# Patient Record
Sex: MALE | Race: WHITE | Employment: FULL TIME | URBAN - METROPOLITAN AREA
[De-identification: names, ages, dates, MRNs, and addresses within clinical notes are randomized per-mention and may not be internally consistent; named-entity substitution may affect disease eponyms.]

---

## 2022-06-29 ENCOUNTER — HOSPITAL ENCOUNTER (EMERGENCY)
Age: 37
Discharge: HOME | End: 2022-06-29
Attending: EMERGENCY MEDICINE
Payer: COMMERCIAL

## 2022-06-29 VITALS
RESPIRATION RATE: 20 BRPM | SYSTOLIC BLOOD PRESSURE: 142 MMHG | OXYGEN SATURATION: 97 % | DIASTOLIC BLOOD PRESSURE: 93 MMHG | HEART RATE: 69 BPM | TEMPERATURE: 97 F

## 2022-06-29 DIAGNOSIS — M54.42 ACUTE LEFT-SIDED LOW BACK PAIN WITH LEFT-SIDED SCIATICA: Primary | ICD-10-CM

## 2022-06-29 PROCEDURE — 74011000250 HC RX REV CODE- 250: Performed by: PHYSICIAN ASSISTANT

## 2022-06-29 PROCEDURE — 74011250637 HC RX REV CODE- 250/637: Performed by: PHYSICIAN ASSISTANT

## 2022-06-29 PROCEDURE — 74011250636 HC RX REV CODE- 250/636: Performed by: PHYSICIAN ASSISTANT

## 2022-06-29 PROCEDURE — 96372 THER/PROPH/DIAG INJ SC/IM: CPT

## 2022-06-29 PROCEDURE — 99284 EMERGENCY DEPT VISIT MOD MDM: CPT

## 2022-06-29 RX ORDER — MORPHINE SULFATE 4 MG/ML
4 INJECTION INTRAVENOUS
Status: COMPLETED | OUTPATIENT
Start: 2022-06-29 | End: 2022-06-29

## 2022-06-29 RX ORDER — OXYCODONE AND ACETAMINOPHEN 5; 325 MG/1; MG/1
1 TABLET ORAL
Qty: 12 TABLET | Refills: 0 | Status: SHIPPED | OUTPATIENT
Start: 2022-06-29 | End: 2022-07-02

## 2022-06-29 RX ORDER — LIDOCAINE 50 MG/G
PATCH TOPICAL
Qty: 15 EACH | Refills: 0 | Status: SHIPPED | OUTPATIENT
Start: 2022-06-29

## 2022-06-29 RX ORDER — LIDOCAINE 4 G/100G
1 PATCH TOPICAL
Status: DISCONTINUED | OUTPATIENT
Start: 2022-06-29 | End: 2022-06-29 | Stop reason: HOSPADM

## 2022-06-29 RX ORDER — ACETAMINOPHEN 500 MG
1000 TABLET ORAL
Status: COMPLETED | OUTPATIENT
Start: 2022-06-29 | End: 2022-06-29

## 2022-06-29 RX ORDER — DICLOFENAC SODIUM 75 MG/1
75 TABLET, DELAYED RELEASE ORAL
Qty: 20 TABLET | Refills: 0 | Status: SHIPPED | OUTPATIENT
Start: 2022-06-29

## 2022-06-29 RX ORDER — METHOCARBAMOL 500 MG/1
500 TABLET, FILM COATED ORAL
Status: COMPLETED | OUTPATIENT
Start: 2022-06-29 | End: 2022-06-29

## 2022-06-29 RX ORDER — KETOROLAC TROMETHAMINE 30 MG/ML
30 INJECTION, SOLUTION INTRAMUSCULAR; INTRAVENOUS
Status: COMPLETED | OUTPATIENT
Start: 2022-06-29 | End: 2022-06-29

## 2022-06-29 RX ORDER — METHOCARBAMOL 500 MG/1
500 TABLET, FILM COATED ORAL
Qty: 20 TABLET | Refills: 0 | Status: SHIPPED | OUTPATIENT
Start: 2022-06-29

## 2022-06-29 RX ADMIN — KETOROLAC TROMETHAMINE 30 MG: 30 INJECTION, SOLUTION INTRAMUSCULAR; INTRAVENOUS at 16:10

## 2022-06-29 RX ADMIN — METHOCARBAMOL TABLETS 500 MG: 500 TABLET, COATED ORAL at 16:10

## 2022-06-29 RX ADMIN — MORPHINE SULFATE 4 MG: 4 INJECTION, SOLUTION INTRAMUSCULAR; INTRAVENOUS at 16:10

## 2022-06-29 RX ADMIN — ACETAMINOPHEN 1000 MG: 500 TABLET ORAL at 16:10

## 2022-06-29 NOTE — ED PROVIDER NOTES
42 y/o male presenting with complaint of back pain. The patient states that he has had intermittent back pain in the past, went to Ortho on-call last week and was prescribed a Medrol Dosepak which he finished today. He had been feeling better over the past day or two and was more active than he had been since the onset of his current episode of back pain, however he noticed worsening back pain last night and woke up with severe pain radiating to his left leg. The pain is rated 10/10, radiating from his left low back to his left calf, described as aching. He took ibuprofen about 30 minutes prior to arrival with some relief. He denies fevers, weakness, numbness, saddle anesthesia, bladder/bowel dysfunction, dysuria or urgency/frequency. The history is provided by the patient and the spouse. No past medical history on file. No past surgical history on file. No family history on file. Social History     Socioeconomic History    Marital status:      Spouse name: Not on file    Number of children: Not on file    Years of education: Not on file    Highest education level: Not on file   Occupational History    Not on file   Tobacco Use    Smoking status: Light Tobacco Smoker     Types: Cigars    Smokeless tobacco: Not on file    Tobacco comment: 2 cigars per year   Substance and Sexual Activity    Alcohol use: Yes     Alcohol/week: 14.0 standard drinks     Types: 14 Cans of beer per week    Drug use: No    Sexual activity: Not on file   Other Topics Concern    Not on file   Social History Narrative    Not on file     Social Determinants of Health     Financial Resource Strain:     Difficulty of Paying Living Expenses: Not on file   Food Insecurity:     Worried About Running Out of Food in the Last Year: Not on file    Gila of Food in the Last Year: Not on file   Transportation Needs:     Lack of Transportation (Medical):  Not on file    Lack of Transportation Daily Note     Today's date: 3/6/2018  Patient name: Deette Boast  : 1969  MRN: [id number]  Referring provider: Karl Garza MD  Dx:   Encounter Diagnosis     ICD-10-CM    1  Right hip pain M25 [id number]                   Subjective: Pt reports continued soreness in R hip  Feels good after PT but notes that it tightens up in the hours following  Called MD w/ questions but has not heard back as of yet  Objective: Pt requires cueing for upright postural control during standing drills at bar  Corrects well  R sided trunk lean during normal ambulation, mod correction w/ cueing       Precautions: Born w/ hip dysplasia     Daily Treatment Diary     Manual   3/6        LAD 2x2 min hold onR 2x2 min holds 2x2 mins holds x2 mins on R  x2 mins        "the stick" to quad, ITB, HS on R x8 mins total x9 mins  x7 mins   x6 mins                                                    Exercise Diary   3/6        SLR w/ QS             bridging 2/ add squeeze 2x10 3x30 w/ add squeeze 3x30 sec w/ add squeeze 3x10 w/ add squeeze 3x10 w/ add squeeze        Hip add squeeze 3 sec hold x 20  5 sec hold 2x10           Supine single leg fall outs (gentle) YTB 2x10 B   clam shells x 30 s/l clam shells x 30        Standing mini squats  x25 total x30 total x20 total x30 at bar        pball iso hip ext 2x10 B, marching 2x10 B  marching and hip abd 2x10  marching w/ 3 sec ecc fall 3x10 on R only        pball isos 3 sec x 10  3 sec x 10  standing hip abd and ext kicks 3x10        pball squats  x30 total through 33%   2x10, focus on glute drive         Stair training     4" step ups 2x10 on R, min UE support         Marching              Self HF stretch in standing x30 sec   reviewed  reviewed        Self HS stretching sitting x30 sec   reviewed  reviewed        Stationary bike pre  x7 min  X7 5 min pre x8 min pre x8 min pre, lvl 3 x8 min pre, lvl 3          Manual HS , cross body glute stretch (Non-Medical): Not on file   Physical Activity:     Days of Exercise per Week: Not on file    Minutes of Exercise per Session: Not on file   Stress:     Feeling of Stress : Not on file   Social Connections:     Frequency of Communication with Friends and Family: Not on file    Frequency of Social Gatherings with Friends and Family: Not on file    Attends Zoroastrian Services: Not on file    Active Member of [address] [place] [address] or Organizations: Not on file    Attends Club or Organization Meetings: Not on file    Marital Status: Not on file   Intimate Partner Violence:     Fear of Current or Ex-Partner: Not on file    Emotionally Abused: Not on file    Physically Abused: Not on file    Sexually Abused: Not on file   Housing Stability:     Unable to Pay for Housing in the Last Year: Not on file    Number of [place] in the Last Year: Not on file    Unstable Housing in the Last Year: Not on file         ALLERGIES: Other food and Benadryl [diphenhydramine hcl]    Review of Systems   Constitutional: Negative for chills and fever. HENT: Negative for congestion. Respiratory: Negative for cough. Gastrointestinal: Negative for diarrhea and vomiting. Genitourinary: Negative for difficulty urinating, dysuria, frequency, hematuria and urgency. Musculoskeletal: Positive for back pain. Skin: Negative for wound. Neurological: Negative for weakness and numbness. Vitals:    06/29/22 1433   BP: (!) 142/93   Pulse: 69   Resp: 20   Temp: 97 °F (36.1 °C)   SpO2: 97%            Physical Exam  Vitals and nursing note reviewed. Constitutional:       General: He is not in acute distress. Appearance: He is well-developed. He is not diaphoretic. HENT:      Head: Normocephalic and atraumatic. Eyes:      Conjunctiva/sclera: Conjunctivae normal.   Cardiovascular:      Rate and Rhythm: Normal rate. Pulmonary:      Effort: Pulmonary effort is normal. No respiratory distress.    Musculoskeletal:      Comments: 3x30 sec holds each Manual HS and hip add stretch 3x30 sec holds on R Manual HS and HF stretch 3x30 sec holds on R Manual HS and HF stretch 3x30 sec holds on R          PROM for R hip flex/ext x5 mins PROM for R hip flex/ext x5 mins PROM for R hip flex/ext x5 mins PROM for R hip flex/ext x5 mins          Manual hip add stretch 3x30 sec holds Self gastroc stretch 3x30 sec holds B               YTB abd walk 4x50'              Heel raises x 30 total                                      Modalities                                                         Assessment: Tolerated treatment well  Patient would benefit from continued PT  Does well w/ today's program, notes discomfort during ambulation but is able to perform all exercises at bar w/o pain through R LE   Encouraged to not perform SLS drills on R as this time so as not to exacerbate sx, verbalizes understanding  MD called pt during visit and assured her that continued discomfort and tightness in hip is normal at this time  Pt expresses relief w/ this  Progress as able at next session  Plan: Continue per plan of care   step ups, gait training, sit<>stand elevated No midline or muscular lumbar tenderness to palpation. No tenderness of left lower extremity. Skin:     General: Skin is warm and dry. Neurological:      Mental Status: He is alert and oriented to person, place, and time. Comments: 5/5 strength of bilateral lower extremities with intact light touch sensation. MDM       Procedures        42 y/o male presenting with complaint of back pain. History and exam consistent with lumbar radiculopathy/disc hernation. No history of trauma or midline tenderness - doubt fractures or ligamentous injuries. No neuro deficits or history of saddle anesthesia or bladder/bowel dysfunction - doubt cauda equina syndrome or acute cord compression. Nothing in the history or exam to concern me for AAA, renal pathology or epidural abscess. Safe for discharge home, with Rx for diclofenac, robaxin, lidocaine patches, and short course of percocet. He will follow up with his spine specialist as scheduled in 2 days. Strict ED return precautions discussed and provided in writing at time of discharge. The patient and his wife verbalized understanding and agreement with this plan.

## 2022-06-29 NOTE — ED TRIAGE NOTES
Pt c/o lower back pain radiating down left leg. Has had back issues last few weeks and given steroids by ortho. Pt worked out yesterday.  Woke up this morning with pain